# Patient Record
Sex: FEMALE | Race: OTHER | URBAN - METROPOLITAN AREA
[De-identification: names, ages, dates, MRNs, and addresses within clinical notes are randomized per-mention and may not be internally consistent; named-entity substitution may affect disease eponyms.]

---

## 2022-06-21 ENCOUNTER — EMERGENCY (EMERGENCY)
Facility: HOSPITAL | Age: 82
LOS: 0 days | Discharge: HOME | End: 2022-06-21
Attending: STUDENT IN AN ORGANIZED HEALTH CARE EDUCATION/TRAINING PROGRAM
Payer: SELF-PAY

## 2022-06-21 VITALS
HEART RATE: 61 BPM | OXYGEN SATURATION: 97 % | TEMPERATURE: 98 F | RESPIRATION RATE: 19 BRPM | SYSTOLIC BLOOD PRESSURE: 145 MMHG | DIASTOLIC BLOOD PRESSURE: 74 MMHG | WEIGHT: 205.03 LBS

## 2022-06-21 DIAGNOSIS — M25.571 PAIN IN RIGHT ANKLE AND JOINTS OF RIGHT FOOT: ICD-10-CM

## 2022-06-21 DIAGNOSIS — E78.5 HYPERLIPIDEMIA, UNSPECIFIED: ICD-10-CM

## 2022-06-21 DIAGNOSIS — E11.9 TYPE 2 DIABETES MELLITUS WITHOUT COMPLICATIONS: ICD-10-CM

## 2022-06-21 DIAGNOSIS — I10 ESSENTIAL (PRIMARY) HYPERTENSION: ICD-10-CM

## 2022-06-21 DIAGNOSIS — M79.661 PAIN IN RIGHT LOWER LEG: ICD-10-CM

## 2022-06-21 DIAGNOSIS — E78.00 PURE HYPERCHOLESTEROLEMIA, UNSPECIFIED: ICD-10-CM

## 2022-06-21 PROCEDURE — 73610 X-RAY EXAM OF ANKLE: CPT | Mod: 26,RT

## 2022-06-21 PROCEDURE — 99284 EMERGENCY DEPT VISIT MOD MDM: CPT

## 2022-06-21 PROCEDURE — 73590 X-RAY EXAM OF LOWER LEG: CPT | Mod: 26,RT

## 2022-06-21 NOTE — ED ADULT TRIAGE NOTE - CHIEF COMPLAINT QUOTE
patient c/o of R ankle swelling and pain started 1 week  days ago , denied injury , denied calf pain no chest pain no difficulty breathing , come from [place] 1 month ago

## 2022-06-21 NOTE — ED PROVIDER NOTE - PATIENT PORTAL LINK FT
You can access the FollowMyHealth Patient Portal offered by [facility] by registering at the following website: [email or web address]. By joining [employer]’s FollowMyHealth portal, you will also be able to view your health information using other applications (apps) compatible with our system.

## 2022-06-21 NOTE — ED ADULT NURSE NOTE - CHIEF COMPLAINT QUOTE
patient c/o of R ankle swelling and pain started 1 week  days ago , denied injury , denied calf pain no chest pain no difficulty breathing , come from [place] 1 month ago

## 2022-06-21 NOTE — ED ADULT NURSE NOTE - OBJECTIVE STATEMENT
Pt AOx4, daughter in law at bedside translating. As per daughter in law, pt has been experiencing R ankle pain for about a week now with increasing pain and swelling. Pain score 5/10 on rest, 8/10 on movement. Daughter in law states that pt has not been able to bear weight on right leg as much as she usually can. Daughter in law denies any recent falls or trauma to right leg. Active ROM noted on right foot with cap refill less than 2 seconds. Swelling on right ankle greater than left ankle. Pt denies SOB, fever/chills, dizziness, headache, blurred vision, chest pain, N/V/D, or dysuria.

## 2022-06-21 NOTE — ED PROVIDER NOTE - MUSCULOSKELETAL, MLM
Spine appears normal, range of motion is not limited, swelling to right lateral posterior ankle, normal pulses, no discoloration, and (+) calf pain.

## 2022-06-21 NOTE — ED PROVIDER NOTE - OBJECTIVE STATEMENT
82 year old female with PMH of DM, HLD, and knee replacement few years ago who presents to the ED for right ankle swelling and pain that started 1 week ago. Pt denies calf pain, trauma, injury, CP, and difficulty breathing. Pt recently traveled from [place] x1 month ago.

## 2022-11-28 NOTE — ED PROVIDER NOTE - IV ALTEPLASE INCLUSION HIDDEN
Patient Quality Outreach    Patient is due for the following:   Asthma  -  ACT needed    Next Steps:   Patient needs to complete an ACT.    Type of outreach:    Tried calling patient. Voicemail was full.      Questions for provider review:    None     Blanca Mays, CMA        
show

## 2024-06-17 ENCOUNTER — EMERGENCY (EMERGENCY)
Facility: HOSPITAL | Age: 84
LOS: 0 days | Discharge: HOME | End: 2024-06-17
Attending: STUDENT IN AN ORGANIZED HEALTH CARE EDUCATION/TRAINING PROGRAM
Payer: SELF-PAY

## 2024-06-17 VITALS
OXYGEN SATURATION: 98 % | SYSTOLIC BLOOD PRESSURE: 148 MMHG | TEMPERATURE: 98 F | DIASTOLIC BLOOD PRESSURE: 69 MMHG | RESPIRATION RATE: 18 BRPM | HEART RATE: 73 BPM

## 2024-06-17 VITALS
HEART RATE: 72 BPM | SYSTOLIC BLOOD PRESSURE: 173 MMHG | WEIGHT: 187.39 LBS | RESPIRATION RATE: 18 BRPM | TEMPERATURE: 98 F | HEIGHT: 60 IN | OXYGEN SATURATION: 98 % | DIASTOLIC BLOOD PRESSURE: 80 MMHG

## 2024-06-17 DIAGNOSIS — Z96.653 PRESENCE OF ARTIFICIAL KNEE JOINT, BILATERAL: ICD-10-CM

## 2024-06-17 DIAGNOSIS — M25.511 PAIN IN RIGHT SHOULDER: ICD-10-CM

## 2024-06-17 DIAGNOSIS — E78.5 HYPERLIPIDEMIA, UNSPECIFIED: ICD-10-CM

## 2024-06-17 DIAGNOSIS — E11.9 TYPE 2 DIABETES MELLITUS WITHOUT COMPLICATIONS: ICD-10-CM

## 2024-06-17 DIAGNOSIS — G89.29 OTHER CHRONIC PAIN: ICD-10-CM

## 2024-06-17 DIAGNOSIS — I10 ESSENTIAL (PRIMARY) HYPERTENSION: ICD-10-CM

## 2024-06-17 PROBLEM — E78.00 PURE HYPERCHOLESTEROLEMIA, UNSPECIFIED: Chronic | Status: ACTIVE | Noted: 2022-06-21

## 2024-06-17 PROCEDURE — 99053 MED SERV 10PM-8AM 24 HR FAC: CPT

## 2024-06-17 PROCEDURE — 99284 EMERGENCY DEPT VISIT MOD MDM: CPT

## 2024-06-17 PROCEDURE — 73030 X-RAY EXAM OF SHOULDER: CPT | Mod: 26,RT

## 2024-06-17 RX ORDER — METHOCARBAMOL 500 MG/1
1500 TABLET, FILM COATED ORAL ONCE
Refills: 0 | Status: COMPLETED | OUTPATIENT
Start: 2024-06-17 | End: 2024-06-17

## 2024-06-17 RX ORDER — METHOCARBAMOL 500 MG/1
2 TABLET, FILM COATED ORAL
Qty: 40 | Refills: 0
Start: 2024-06-17 | End: 2024-06-26

## 2024-06-17 RX ORDER — LIDOCAINE 4 G/100G
1 CREAM TOPICAL ONCE
Refills: 0 | Status: COMPLETED | OUTPATIENT
Start: 2024-06-17 | End: 2024-06-17

## 2024-06-17 RX ORDER — KETOROLAC TROMETHAMINE 30 MG/ML
15 SYRINGE (ML) INJECTION ONCE
Refills: 0 | Status: DISCONTINUED | OUTPATIENT
Start: 2024-06-17 | End: 2024-06-17

## 2024-06-17 RX ORDER — OXYCODONE AND ACETAMINOPHEN 5; 325 MG/1; MG/1
1 TABLET ORAL
Qty: 5 | Refills: 0
Start: 2024-06-17 | End: 2024-06-21

## 2024-06-17 RX ORDER — LIDOCAINE 4 G/100G
1 CREAM TOPICAL
Qty: 4 | Refills: 0
Start: 2024-06-17 | End: 2024-06-26

## 2024-06-17 RX ADMIN — METHOCARBAMOL 1500 MILLIGRAM(S): 500 TABLET, FILM COATED ORAL at 02:17

## 2024-06-17 RX ADMIN — Medication 15 MILLIGRAM(S): at 02:16

## 2024-06-17 RX ADMIN — LIDOCAINE 1 PATCH: 4 CREAM TOPICAL at 02:21

## 2024-06-17 NOTE — ED ADULT NURSE NOTE - NSFALLHARMRISKINTERV_ED_ALL_ED

## 2024-06-17 NOTE — ED ADULT TRIAGE NOTE - CHIEF COMPLAINT QUOTE
pt c/o right shoulder pain this morning.  Pt unable to raise arm on own.  denies any fall or trauma to area.  pt able to move hand, fingers, and able to bend at elbow.   hx of HTN, cardiac stents, DM.  nkda

## 2024-06-17 NOTE — ED PROVIDER NOTE - CLINICAL SUMMARY MEDICAL DECISION MAKING FREE TEXT BOX
84-year-old female with past medical history of hypertension hyperlipidemia diabetes presenting with right shoulder pain for 3 days.  Patient states she did not have any trauma.  Has had chronic right shoulder pain but worse over the last 3 days, worse tonight.  Pain with movement.  Denies any systemic symptoms such as fever or chills.  Denies chest pain.  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  Not sure if she has arthritis in the shoulder.  Exam findings above, patient initially appears uncomfortable.  However after analgesia more comfortable now, x-ray shows likely arthritis.  Will send analgesia to pharmacy and sports medicine follow-up.  Patient and family comfortable plan for discharge

## 2024-06-17 NOTE — ED PROVIDER NOTE - PATIENT PORTAL LINK FT
You can access the FollowMyHealth Patient Portal offered by [facility] by registering at the following website: [email or web address]. By joining [employer]’s FollowMyHealth portal, you will also be able to view your health information using other applications (apps) compatible with our system.

## 2024-06-17 NOTE — ED ADULT NURSE NOTE - OBJECTIVE STATEMENT
pt Tajik speaking with daughter in law at bedside translating. Covering for primary RN Lukasz. Pt c/o right shoulder pain this morning. Pt states the right shoulder would sometimes bother her but today it was intolerable pain for the first time. Pt visiting from [place]. Pt unable to raise arm on own. Palpable pulses in b/l arms at this time. Pt denies any fall or trauma to area, numbness or tingling, SOB, cp, n/v/d, fever or chills. Pt able to move hand, fingers, and able to bend at elbow. PMHx of HTN, cardiac stents, DM. nkda. Right arm edema and tenderness noted at this time

## 2024-06-17 NOTE — ED PROVIDER NOTE - PHYSICAL EXAMINATION
General: No acute distress, mentation at baseline,  well nourished, well developed  HEENT: NCAT, Neck supple without meningismus, PERRL, no conjunctival injection  Lungs: CTAB, No wheeze or crackles, No retractions, No increased work of breathing  Heart: S1S2 RRR, No M/R/G, Pules equal Bilaterally in upper and lower extremities distally  Abd: soft, NT/ND, No guarding, No rebound.  No hernias, no palpable masses.  Extrem: FROM in all joints, no gross deformities appreciated, no significant edema noted, No ulcers. Cap refil < 2sec. Appearance of Glenohumeral joint normal, Nontender Clavicle and Humerus, Sensation over deltoid intact, Neurovascular exam distally intact, Compartments surrounding are soft. able to range but with signifcant pain  Skin: No rash noted, warm dry.  Neuro:  Grossly normal.  No difficulty ambulating. No focal deficits.  Psychiatric: Appropriate mood and affect.

## 2024-06-17 NOTE — ED PROVIDER NOTE - NSFOLLOWUPINSTRUCTIONS_ED_ALL_ED_FT
Arthritis: Care Instructions  Skip Navigation    Overview  Arthritis, also called osteoarthritis, is a breakdown of the cartilage that cushions your joints. When the cartilage wears down, your bones rub against each other. This causes pain and stiffness. Many people have some arthritis as they age. Arthritis most often affects the joints of the spine, hands, hips, knees, or feet.    Arthritis never goes away completely. But medicine and home treatment can help reduce pain and help you stay active.    Follow-up care is a key part of your treatment and safety. Be sure to make and go to all appointments, and call your doctor if you are having problems. It's also a good idea to know your test results and keep a list of the medicines you take.    How can you care for yourself at home?  Stay at a healthy weight. Being overweight puts extra strain on your joints.  Talk to your doctor or physical therapist about exercises that will help ease joint pain.  Stretch. You may enjoy gentle forms of yoga to help keep your joints and muscles flexible.  Walk instead of jog. Other types of exercise that are less stressful on the joints include riding a bike, swimming, vernon chi, or water exercise.  Lift weights. Strong muscles help reduce stress on your joints. Stronger thigh muscles, for example, take some of the stress off of the knees and hips. Learn the right way to lift weights so you don't make joint pain worse.  Take your medicines exactly as prescribed. Call your doctor if you think you are having a problem with your medicine.  Take pain medicines exactly as directed.  If the doctor gave you a prescription medicine for pain, take it as prescribed.  If you are not taking a prescription pain medicine, ask your doctor if you can take an over-the-counter medicine.  Use a cane, crutch, walker, or another device if you need help to get around. These can help rest your joints. You also can use other things to make life easier, such as a higher toilet seat and padded handles on kitchen utensils.  Do not sit in low chairs. They can make it hard to get up.  Put heat or cold on your sore joints as needed. Use whichever helps you most. You can also switch between hot and cold packs.  Apply heat 2 or 3 times a day for 20 to 30 minutes—using a heating pad, hot shower, or hot pack—to relieve pain and stiffness. But don't use heat on a swollen joint.  Put ice or a cold pack on your sore joint for 10 to 20 minutes at a time. Put a thin cloth between the ice and your skin.  When should you call for help?  	  Call your doctor now or seek immediate medical care if:    You have sudden swelling, warmth, or pain in any joint.  You have joint pain and a fever or rash.  You have such bad pain that you cannot use a joint.  Watch closely for changes in your health, and be sure to contact your doctor if:    You have mild joint symptoms that continue even with more than 6 weeks of care at home.  You have stomach pain or other problems with your medicine

## 2024-08-05 ENCOUNTER — INPATIENT (INPATIENT)
Facility: HOSPITAL | Age: 84
LOS: 0 days | Discharge: HOME | End: 2024-08-06
Attending: INTERNAL MEDICINE | Admitting: INTERNAL MEDICINE
Payer: SELF-PAY

## 2024-08-05 VITALS
HEIGHT: 60 IN | RESPIRATION RATE: 16 BRPM | WEIGHT: 189.6 LBS | TEMPERATURE: 98 F | OXYGEN SATURATION: 98 % | SYSTOLIC BLOOD PRESSURE: 97 MMHG | DIASTOLIC BLOOD PRESSURE: 59 MMHG | HEART RATE: 69 BPM

## 2024-08-05 DIAGNOSIS — D64.9 ANEMIA, UNSPECIFIED: ICD-10-CM

## 2024-08-05 DIAGNOSIS — R55 SYNCOPE AND COLLAPSE: ICD-10-CM

## 2024-08-05 DIAGNOSIS — I10 ESSENTIAL (PRIMARY) HYPERTENSION: ICD-10-CM

## 2024-08-05 DIAGNOSIS — N17.9 ACUTE KIDNEY FAILURE, UNSPECIFIED: ICD-10-CM

## 2024-08-05 DIAGNOSIS — Z95.5 PRESENCE OF CORONARY ANGIOPLASTY IMPLANT AND GRAFT: Chronic | ICD-10-CM

## 2024-08-05 DIAGNOSIS — I25.10 ATHEROSCLEROTIC HEART DISEASE OF NATIVE CORONARY ARTERY WITHOUT ANGINA PECTORIS: ICD-10-CM

## 2024-08-05 DIAGNOSIS — E11.9 TYPE 2 DIABETES MELLITUS WITHOUT COMPLICATIONS: ICD-10-CM

## 2024-08-05 LAB
ALBUMIN SERPL ELPH-MCNC: 2.9 G/DL — LOW (ref 3.3–5)
ALP SERPL-CCNC: 53 U/L — SIGNIFICANT CHANGE UP (ref 40–120)
ALT FLD-CCNC: 17 U/L — SIGNIFICANT CHANGE UP (ref 12–78)
ANION GAP SERPL CALC-SCNC: 9 MMOL/L — SIGNIFICANT CHANGE UP (ref 5–17)
ANISOCYTOSIS BLD QL: SLIGHT — SIGNIFICANT CHANGE UP
APPEARANCE UR: CLEAR — SIGNIFICANT CHANGE UP
AST SERPL-CCNC: 17 U/L — SIGNIFICANT CHANGE UP (ref 15–37)
BASOPHILS # BLD AUTO: 0.02 K/UL — SIGNIFICANT CHANGE UP (ref 0–0.2)
BASOPHILS NFR BLD AUTO: 0.3 % — SIGNIFICANT CHANGE UP (ref 0–2)
BILIRUB SERPL-MCNC: 0.2 MG/DL — SIGNIFICANT CHANGE UP (ref 0.2–1.2)
BILIRUB UR-MCNC: NEGATIVE — SIGNIFICANT CHANGE UP
BLD GP AB SCN SERPL QL: SIGNIFICANT CHANGE UP
BUN SERPL-MCNC: 77 MG/DL — HIGH (ref 7–23)
CALCIUM SERPL-MCNC: 8.7 MG/DL — SIGNIFICANT CHANGE UP (ref 8.5–10.1)
CHLORIDE SERPL-SCNC: 106 MMOL/L — SIGNIFICANT CHANGE UP (ref 96–108)
CO2 SERPL-SCNC: 23 MMOL/L — SIGNIFICANT CHANGE UP (ref 22–31)
COLOR SPEC: YELLOW — SIGNIFICANT CHANGE UP
CREAT SERPL-MCNC: 1.37 MG/DL — HIGH (ref 0.5–1.3)
DIFF PNL FLD: NEGATIVE — SIGNIFICANT CHANGE UP
EGFR: 38 ML/MIN/1.73M2 — LOW
EOSINOPHIL # BLD AUTO: 0.16 K/UL — SIGNIFICANT CHANGE UP (ref 0–0.5)
EOSINOPHIL NFR BLD AUTO: 2.4 % — SIGNIFICANT CHANGE UP (ref 0–6)
FLUAV AG NPH QL: SIGNIFICANT CHANGE UP
FLUBV AG NPH QL: SIGNIFICANT CHANGE UP
GLUCOSE SERPL-MCNC: 138 MG/DL — HIGH (ref 70–99)
GLUCOSE UR QL: NEGATIVE MG/DL — SIGNIFICANT CHANGE UP
HCT VFR BLD CALC: 18.2 % — CRITICAL LOW (ref 34.5–45)
HGB BLD-MCNC: 5.9 G/DL — CRITICAL LOW (ref 11.5–15.5)
HYPOCHROMIA BLD QL: SLIGHT — SIGNIFICANT CHANGE UP
IMM GRANULOCYTES NFR BLD AUTO: 0.3 % — SIGNIFICANT CHANGE UP (ref 0–0.9)
KETONES UR-MCNC: NEGATIVE MG/DL — SIGNIFICANT CHANGE UP
LEUKOCYTE ESTERASE UR-ACNC: NEGATIVE — SIGNIFICANT CHANGE UP
LYMPHOCYTES # BLD AUTO: 1.81 K/UL — SIGNIFICANT CHANGE UP (ref 1–3.3)
LYMPHOCYTES # BLD AUTO: 26.8 % — SIGNIFICANT CHANGE UP (ref 13–44)
MACROCYTES BLD QL: SLIGHT — SIGNIFICANT CHANGE UP
MAGNESIUM SERPL-MCNC: 2.3 MG/DL — SIGNIFICANT CHANGE UP (ref 1.6–2.6)
MANUAL SMEAR VERIFICATION: SIGNIFICANT CHANGE UP
MCHC RBC-ENTMCNC: 22.9 PG — LOW (ref 27–34)
MCHC RBC-ENTMCNC: 32.4 G/DL — SIGNIFICANT CHANGE UP (ref 32–36)
MCV RBC AUTO: 70.5 FL — LOW (ref 80–100)
MICROCYTES BLD QL: SLIGHT — SIGNIFICANT CHANGE UP
MONOCYTES # BLD AUTO: 0.41 K/UL — SIGNIFICANT CHANGE UP (ref 0–0.9)
MONOCYTES NFR BLD AUTO: 6.1 % — SIGNIFICANT CHANGE UP (ref 2–14)
NEUTROPHILS # BLD AUTO: 4.33 K/UL — SIGNIFICANT CHANGE UP (ref 1.8–7.4)
NEUTROPHILS NFR BLD AUTO: 64.1 % — SIGNIFICANT CHANGE UP (ref 43–77)
NITRITE UR-MCNC: NEGATIVE — SIGNIFICANT CHANGE UP
NRBC # BLD: 0 /100 WBCS — SIGNIFICANT CHANGE UP (ref 0–0)
OVALOCYTES BLD QL SMEAR: SLIGHT — SIGNIFICANT CHANGE UP
PH UR: 5 — SIGNIFICANT CHANGE UP (ref 5–8)
PLAT MORPH BLD: NORMAL — SIGNIFICANT CHANGE UP
PLATELET # BLD AUTO: 121 K/UL — LOW (ref 150–400)
PLATELET COUNT - ESTIMATE: ABNORMAL
POIKILOCYTOSIS BLD QL AUTO: SLIGHT — SIGNIFICANT CHANGE UP
POTASSIUM SERPL-MCNC: 4.4 MMOL/L — SIGNIFICANT CHANGE UP (ref 3.5–5.3)
POTASSIUM SERPL-SCNC: 4.4 MMOL/L — SIGNIFICANT CHANGE UP (ref 3.5–5.3)
PROT SERPL-MCNC: 6.4 GM/DL — SIGNIFICANT CHANGE UP (ref 6–8.3)
PROT UR-MCNC: NEGATIVE MG/DL — SIGNIFICANT CHANGE UP
RBC # BLD: 2.58 M/UL — LOW (ref 3.8–5.2)
RBC # FLD: 15.8 % — HIGH (ref 10.3–14.5)
RBC BLD AUTO: SIGNIFICANT CHANGE UP
SARS-COV-2 RNA SPEC QL NAA+PROBE: SIGNIFICANT CHANGE UP
SCHISTOCYTES BLD QL AUTO: SLIGHT — SIGNIFICANT CHANGE UP
SODIUM SERPL-SCNC: 138 MMOL/L — SIGNIFICANT CHANGE UP (ref 135–145)
SP GR SPEC: 1.01 — SIGNIFICANT CHANGE UP (ref 1–1.03)
TROPONIN I, HIGH SENSITIVITY RESULT: 14.5 NG/L — SIGNIFICANT CHANGE UP
UROBILINOGEN FLD QL: 0.2 MG/DL — SIGNIFICANT CHANGE UP (ref 0.2–1)
WBC # BLD: 6.75 K/UL — SIGNIFICANT CHANGE UP (ref 3.8–10.5)
WBC # FLD AUTO: 6.75 K/UL — SIGNIFICANT CHANGE UP (ref 3.8–10.5)

## 2024-08-05 PROCEDURE — 99291 CRITICAL CARE FIRST HOUR: CPT

## 2024-08-05 PROCEDURE — 93010 ELECTROCARDIOGRAM REPORT: CPT

## 2024-08-05 PROCEDURE — 71046 X-RAY EXAM CHEST 2 VIEWS: CPT | Mod: 26

## 2024-08-05 PROCEDURE — 99223 1ST HOSP IP/OBS HIGH 75: CPT

## 2024-08-05 RX ORDER — ONDANSETRON HCL/PF 4 MG/2 ML
4 VIAL (ML) INJECTION EVERY 8 HOURS
Refills: 0 | Status: DISCONTINUED | OUTPATIENT
Start: 2024-08-05 | End: 2024-08-06

## 2024-08-05 RX ORDER — ONDANSETRON HCL/PF 4 MG/2 ML
4 VIAL (ML) INJECTION ONCE
Refills: 0 | Status: COMPLETED | OUTPATIENT
Start: 2024-08-05 | End: 2024-08-05

## 2024-08-05 RX ORDER — DEXTROSE 4 G
25 TABLET,CHEWABLE ORAL ONCE
Refills: 0 | Status: DISCONTINUED | OUTPATIENT
Start: 2024-08-05 | End: 2024-08-06

## 2024-08-05 RX ORDER — CITALOPRAM HYDROBROMIDE 20 MG
20 TABLET ORAL DAILY
Refills: 0 | Status: DISCONTINUED | OUTPATIENT
Start: 2024-08-05 | End: 2024-08-06

## 2024-08-05 RX ORDER — DOXAZOSIN 2 MG/1
4 TABLET ORAL DAILY
Refills: 0 | Status: DISCONTINUED | OUTPATIENT
Start: 2024-08-05 | End: 2024-08-06

## 2024-08-05 RX ORDER — FERROUS SULFATE 325(65) MG
1 TABLET ORAL
Refills: 0 | DISCHARGE

## 2024-08-05 RX ORDER — ESOMEPRAZOLE MAGNESIUM 40 MG
1 CAPSULE,DELAYED RELEASE (ENTERIC COATED) ORAL
Refills: 0 | DISCHARGE

## 2024-08-05 RX ORDER — MAGNESIUM, ALUMINUM HYDROXIDE 200-225/5
30 SUSPENSION, ORAL (FINAL DOSE FORM) ORAL EVERY 4 HOURS
Refills: 0 | Status: DISCONTINUED | OUTPATIENT
Start: 2024-08-05 | End: 2024-08-06

## 2024-08-05 RX ORDER — ACETAMINOPHEN 500 MG
650 TABLET ORAL EVERY 6 HOURS
Refills: 0 | Status: DISCONTINUED | OUTPATIENT
Start: 2024-08-05 | End: 2024-08-06

## 2024-08-05 RX ORDER — METOPROLOL TARTRATE 100 MG
1 TABLET ORAL
Refills: 0 | DISCHARGE

## 2024-08-05 RX ORDER — METOPROLOL TARTRATE 100 MG
50 TABLET ORAL DAILY
Refills: 0 | Status: DISCONTINUED | OUTPATIENT
Start: 2024-08-05 | End: 2024-08-06

## 2024-08-05 RX ORDER — CITALOPRAM HYDROBROMIDE 20 MG
1 TABLET ORAL
Refills: 0 | DISCHARGE

## 2024-08-05 RX ORDER — DEXTROSE 4 G
12.5 TABLET,CHEWABLE ORAL ONCE
Refills: 0 | Status: DISCONTINUED | OUTPATIENT
Start: 2024-08-05 | End: 2024-08-06

## 2024-08-05 RX ORDER — INSULIN LISPRO 100/ML
VIAL (ML) SUBCUTANEOUS
Refills: 0 | Status: DISCONTINUED | OUTPATIENT
Start: 2024-08-05 | End: 2024-08-06

## 2024-08-05 RX ORDER — DEXTROSE MONOHYDRATE, SODIUM CHLORIDE, SODIUM LACTATE, CALCIUM CHLORIDE, MAGNESIUM CHLORIDE 1.5; 538; 448; 18.4; 5.08 G/100ML; MG/100ML; MG/100ML; MG/100ML; MG/100ML
1000 SOLUTION INTRAPERITONEAL
Refills: 0 | Status: DISCONTINUED | OUTPATIENT
Start: 2024-08-05 | End: 2024-08-06

## 2024-08-05 RX ORDER — ASPIRIN 500 MG
1 TABLET ORAL
Refills: 0 | DISCHARGE

## 2024-08-05 RX ORDER — BACTERIOSTATIC SODIUM CHLORIDE 0.9 %
500 VIAL (ML) INJECTION ONCE
Refills: 0 | Status: COMPLETED | OUTPATIENT
Start: 2024-08-05 | End: 2024-08-05

## 2024-08-05 RX ORDER — MELATONIN 3 MG
3 TABLET ORAL AT BEDTIME
Refills: 0 | Status: DISCONTINUED | OUTPATIENT
Start: 2024-08-05 | End: 2024-08-06

## 2024-08-05 RX ORDER — LINAGLIPTIN 5 MG/1
1 TABLET, FILM COATED ORAL
Refills: 0 | DISCHARGE

## 2024-08-05 RX ORDER — GLUCAGON INJECTION, SOLUTION 0.5 MG/.1ML
1 INJECTION, SOLUTION SUBCUTANEOUS ONCE
Refills: 0 | Status: DISCONTINUED | OUTPATIENT
Start: 2024-08-05 | End: 2024-08-06

## 2024-08-05 RX ORDER — DOXAZOSIN 2 MG/1
1 TABLET ORAL
Refills: 0 | DISCHARGE

## 2024-08-05 RX ORDER — PANTOPRAZOLE SODIUM 20 MG/1
40 TABLET, DELAYED RELEASE ORAL
Refills: 0 | Status: DISCONTINUED | OUTPATIENT
Start: 2024-08-05 | End: 2024-08-06

## 2024-08-05 RX ORDER — FERROUS SULFATE 325(65) MG
325 TABLET ORAL DAILY
Refills: 0 | Status: DISCONTINUED | OUTPATIENT
Start: 2024-08-05 | End: 2024-08-06

## 2024-08-05 RX ORDER — DEXTROSE 4 G
15 TABLET,CHEWABLE ORAL ONCE
Refills: 0 | Status: DISCONTINUED | OUTPATIENT
Start: 2024-08-05 | End: 2024-08-06

## 2024-08-05 RX ADMIN — Medication 4 MILLIGRAM(S): at 18:45

## 2024-08-05 RX ADMIN — Medication 500 MILLILITER(S): at 19:00

## 2024-08-05 RX ADMIN — Medication 1000 MILLILITER(S): at 18:30

## 2024-08-05 NOTE — ED ADULT NURSE NOTE - OBJECTIVE STATEMENT
83 yo female bib daughter-in-law c/o generalized weakness, fatigue x3 days. Pt states feel similar to previous episodes of anemia which required blood transfusions. Per pt, has to have blood transfusions every few months. Pmhx iron deficiency anemia, DM, HTN, HLD, carpal tunnel left wrist. Pt denies any epistaxis, hematuria, bloody stool. Pt denies cp, sob, dizziness, palpitations, headache, blurry vision, light sensitivity, n/v/d/c, abdominal pain, neck pain, back pain, numbness/tingling. 12 lead ECG completed. Respirations even and unlabored.

## 2024-08-05 NOTE — ED PROVIDER NOTE - PROGRESS NOTE DETAILS
KRYSTYNA MULLEN: late entry-- approx 1 hr ago pt had near syncopal episode after walking to bathroom. /60, but lightheaded/nauseated/dry heaving/diaphoretic-- joint decision making with daughter in law to keep pt in hospital.

## 2024-08-05 NOTE — ED CLERICAL - NS ED CLERK UNITS
Called and spoke with Jack and confirmed he is talking about the no right handed work restrictions.  I let him know I would fax the restrictions as requested.    Letter faxed   RF TELE

## 2024-08-05 NOTE — H&P ADULT - HISTORY OF PRESENT ILLNESS
Ru Cooper is an 84 year old female with PMHx of HTN, HLD, CAD (s/p 3 stents), and anemia (receives pRBC transfusion q3 months) who presented to the ED on 8/5/24 for complaints of weakness.    Chinese Estephania ID #[id number] utilized. Patient reports she has been having generalized weakness for the past few days and difficulty walking due to that. Has been feeling fatigued. States this is always how she feels when she needs a blood transfusion. Last transfusion was about three months ago. Denies hematochezia or melena. States she was informed her anemia is from bleeding from small intestine? However, not offered intervention due to age. Last took aspirin yesterday, which is prescribed for her history of PCI. Baseline functional status is ambulates with cane and independent with all ADLs. Lives at home alone in [place]. Currently visiting daughter in [place].     In the ED, VSS except BP as low as 97/59. No documented hypoxia but placed on 3L for symptomatic relief?Hgb 5.9, plts 121K, BUN 77, Cr 1.37. U/A unremarkable. COVID/influenza negative. CXR unremarkable. Typed and screened. Received  cc bolus. Patient got out of bed to use the bathroom and was found slumped over and unresponsive in bedside chair. Patient then became more alert and began vomiting. Ondansetron 4 mg IV administered. Ru Cooper is an 84 year old female with PMHx of HTN, HLD, CAD (s/p 3 stents), and anemia (receives pRBC transfusion q3 months) who presented to the ED on 8/5/24 for complaints of weakness.    Aidan Estephania ID #[id number] utilized. Patient reports she has been having generalized weakness for the past few days and difficulty walking due to that. Has been feeling fatigued. States this is always how she feels when she needs a blood transfusion. Last transfusion was about three months ago. Denies hematochezia or melena. Of note, ER PA documentation with dark stools for the past week but patient denied this at the time of my examination. States she was informed her anemia is from bleeding from small intestine? However, not offered intervention due to age. Last took aspirin yesterday, which is prescribed for her history of PCI. Baseline functional status is ambulates with cane and independent with all ADLs. Lives at home alone in [place]. Currently visiting daughter in [place].     In the ED, VSS except BP as low as 97/59. No documented hypoxia but placed on 3L for symptomatic relief?Hgb 5.9, plts 121K, BUN 77, Cr 1.37. U/A unremarkable. COVID/influenza negative. CXR unremarkable. Typed and screened. Received  cc bolus. Two unit pRBCs ordered but not yet administered. Patient got out of bed to use the bathroom and was found slumped over and unresponsive in bedside chair. Patient then became more alert and began vomiting. Ondansetron 4 mg IV administered.

## 2024-08-05 NOTE — ED PROVIDER NOTE - CLINICAL SUMMARY MEDICAL DECISION MAKING FREE TEXT BOX
ddX: anemia, electrolyte abnormality, arrhythmia, blessing, uti, viral illness   symptomatic anemia   discussed with dr. barr accepts admission to her service

## 2024-08-05 NOTE — ED ADULT NURSE REASSESSMENT NOTE - NS ED NURSE REASSESS COMMENT FT1
Pt found slumped over and unresponsive in bedside chair with DIL beside her calling for help. Pt unresponsive to pain, +snoring. Palpable radial pulse present. Pt was lifted back into bed by 4 staff members. KRYSTYNA Jones at bedside. Pt became more alert once back in bed and began vomiting (zofran 4mg iv push given x1). 2L O2 NC placed with improvement of spo2 to 100%. Pt awaiting PRBC transfusion. Confirmatory tube sent to lab, awaiting results. Side rails up. Comfort and safety maintained. Cardiac and spo2 monitoring in place.

## 2024-08-05 NOTE — ED PROVIDER NOTE - CRITERIA ADMIT PEDS PT
May 19, 2021       Katharine Monae MD  [address]  [address]  [facility] [address]  Via In Basket      Patient: Rula Shipman   YOB: 1986   Date of Visit: 2021       Dear Dr. Monae:    Thank you for referring Rula Shipman to me for evaluation. Below are my notes for this visit with her.    If you have questions, please do not hesitate to call me. I look forward to following your patient along with you.      Sincerely,        Chucky Jimenez, DO        CC: No Recipients  Chucky Jimenez DO  2021  3:14 PM  Signed           NEUROSURGERY Consult    :  1986, MRN:  [id number], CSN:  [id number]  Rula Shipman is seen on 2021 in Neurosurgical Consultation at [facility] at the request of Katharine Monae MD.  PCP:  Katharine Monae MD     HISTORY of PRESENT ILLNESS:    Ms. Shipman is a 35 year old   female presenting with complaint of low back and leg pain. Patient with mild low back pain issues since she was a teenager. Mostly improved with rest. She did have sciatica during both pregnancies which resolved spontaneously near the end of the pregnancy. In January she was placing her child in a car seat, believes she may have twisted the wrong way, then developed more severe back pain. Pain in bilateral buttocks. Worse pain radiating in to the outer left leg and numbness along the outer right leg. She has been going to Chiropractor and PT which has improved her symptoms little. Exacerbated when bending forward, and getting in and out of the car. Little relief with 600mg of Ibuprofen. Xray of her lumbar spine reveals L5-S1 spondylolisthesis.     ALLERGIES:    ALLERGIES:   Allergen Reactions   • Montelukast Hallucinations   • Seasonal Other (See Comments)       MEDICATIONS:    Current Outpatient Medications   Medication Sig Dispense Refill   • ALPRAZolam (XANAX) 0.5 MG tablet Take 0.5 mg by mouth 1 time as needed.     • Magnesium 200 MG Tab Take 1  tablet by mouth 2 (two) times a day.     • Cholecalciferol (Vitamin D3) 1.25 mg (50,000 units) tablet Take 1 tablet by mouth daily.     • albuterol 108 (90 Base) MCG/ACT inhaler Inhale 2 puffs into the lungs every 4 hours as needed for Shortness of Breath or Wheezing. 1 Inhaler 3   • amphetamine-dextroamphetamine (ADDERALL) 30 MG tablet Take 30 mg by mouth daily.     • buPROPion (WELLBUTRIN XL) 150 MG 24 hr tablet Take 150 mg by mouth daily.       No current facility-administered medications for this visit.        MEDICAL History:    Past Medical History:   Diagnosis Date   • Allergic rhinitis    • Anxiety Disorder     PT DENIES ANXIETY   • Asthma     exercised enduced   • Bilateral conjunctivitis    • Closed Colles' fracture 2011    Left   • Fatigue    • Hypoglycemia    • Insomnia    • Pap Dysplasia - LSIL 2007    Mild dysplasia/SKYLAR I   • Pharyngitis    • PILONIDAL CYST     removed     Patient Active Problem List   Diagnosis   • Manic (Bipolar) Depression   • Anxiety Disorder   • Environmental allergies   • Insulin controlled gestational diabetes mellitus (GDM) in third trimester   • Migraine without aura and without status migrainosus, not intractable   • Chronic or old strain of lumbosacral spine   • 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   • Spondylolisthesis at L5-S1 level       SURGICAL History:    Past Surgical History:   Procedure Laterality Date   •  section, low transverse     • Colposcopy cervix & upper / adjacent vagina  2007    Colposcopy   • Nerve repair      of hand   • Pap smear,routine  2010    Negative   • Past surgical history         • Wrist fracture surgery         SOCIAL History:    Social History     Tobacco Use   • Smoking status: Never Smoker   • Smokeless tobacco: Never Used   Substance Use Topics   • Alcohol use: No   • Drug use: No       FAMILY History:    Family History   Problem Relation Age of Onset   • Cancer Maternal Aunt            breast, ? lung as well. 60s   • Cancer Maternal Grandmother         breast dx: 83   • Cancer Paternal Grandfather         lung-heavy smoker   • Heart Paternal Grandfather         heart attack   • Cancer Paternal Aunt         lung-heavy smoker   • Thyroid Mother    • Thyroid Father    • Depression Father        HEALTH MAINTENANCE:    Health Maintenance   Topic Date Due   • Pneumococcal Vaccine 0-64 (1 of 2 - PPSV23) Never done   • Influenza Vaccine (Season Ended) 08/01/2021   • Cervical Cancer Screening HPV CO-Testing  11/09/2021   • DTaP/Tdap/Td Vaccine (3 - Td) 05/19/2027   • COVID-19 Vaccine  Completed   • Hepatitis B Vaccine  Aged Out   • Meningococcal Vaccine  Aged Out   • HPV Vaccine  Aged Out       REVIEW of SYSTEMS:    Constitutional Symptoms  [] Good general health lately  [] Recent weight gain  [] Recent weight loss  [] Fatigue  [] Fever    Eyes  [] Blurred Vision  [] Double vision  [] Wear glasses/contact lenses    Ears/Nose/Mouth/Throat  [] Hearing loss  [] Ringing in ears  [] Chronic sinus problem   [] Nose bleeds  [] Sore throat  [] Difficulty swallowing    Cardiovascular  [] Swelling feet, ankles or hands  [] Chest pain or angina pectoris  [] Palpitations   [] Cold extremities    Respiratory  [] Wheezing  [] Chronic or frequent coughs   [] Spitting up blood   [] Shortness of breath  [] Sleep apnea   Gastrointestinal  [] Abdominal pain  [] Loss of appetite  [] Nausea  [] Diarrhea  [] Constipation    Integumentary  [] Breast discharge  [] Change in skin color  [] Change in hair or nails  [] Varicose veins  [] Rash or itching    Neurologic  [] Frequent headaches  [] History of head injury  [] Seizures  [] Numbness or tingling  [] Tremors  [] Paralysis  [] Lightheaded  [] Dizziness    Psychiatric  [] Insomnia  [] Memory loss  [] Anxiety  [] Depression   Endocrine  [] Change in hat or glove size  [] Excessive thirst or urination  [] Temperature intolerance  [] Hormone issues    Musculoskeletal  []  Joint pain  [] Joint stiffness  [] Swollen joints  [] Difficulty walking  [] Muscle pain or cramps  [x] Back pain  [] Weakness    Hematologic/Lymphatic  [] Slow to heal after cuts  [] Bleeding/bruising tendency  [] Enlarged lymph glands    Genitourinary  [] Frequent urination  [] Burning or painful urination  [] Blood in urine  [] Straining to urinate  [] Incontinence or dribbling  [] History of kidney stones  [] Sexual difficulty   All other systems reviewed and are negative other than those checked above.    PHYSICAL EXAM:    Visit Vitals  /80 (BP Location: LUE - Left upper extremity, Patient Position: Sitting, Cuff Size: Regular)   Pulse 80   Ht 5' 3\" (1.6 m)   Wt 88.5 kg   LMP 03/08/2021 (Approximate)   BMI 34.54 kg/m²     Ms. Shipman is a well developed, well nourished 35 year old female in no acute distress.   Higher cognitive functions:   Awake, alert, and oriented to time, place, person, and circumstance   Memory -- good   Speech and Language functions -- intact   Attention and Concentration -- good   Knowledge -- good  Cranial Nerves II-XII    CN 2 Vision intact    CN 3,4,6 Extraocular motion intact  Pupils equally round and reactive    CN 5 Facial sensation intact    CN 7 Facial motor symmetric    CN 8 Hearing intact    CN 9 Palate symmetric    CN 11 Shoulder shrug 5/5    CN 12 Tongue movement symmetric   Motor Exam demonstrates no fasciculations, wasting, or changes in tone   in all extremities.  Strength is 5/5 and equal bilaterally in all extremities.  Sensation is grossly intact in all extremities.  Coordination is good in all extremities.  Deep Tendon Reflexes are 1/4 in all extremities.  Negative straight leg raise.  Station is steady.  Gait is tandem.  Pulses are intact in all extremities, which are appropriate temperature.    No clubbing, cyanosis or edema.      IMAGING:  Xray lumbar spine images reviewed:  Grade I L5-S1 spondylolisthesis, decrease in disc space height at L5-S1. No  fractures. Sagittal alignment appropriate.    _______________________________________________________________      IMPRESSIONs:    1. Spondylosis of lumbosacral spine with radiculopathy    2. Spondylolisthesis at L5-S1 level       35 year old female with back and leg pain, no relief with PT and Chiropractor to this point. Xray reveals L5-S1 spondylolisthesis with loss of disc space height. Will obtain additional imaging to better characterize and evaluate for nerve compression.    RECOMMENDATIONs:    - MRI lumbar spine  - Lumbar flexion/extension xrays  - return when imaging complete    Future Appointments   Date Time Provider Department Center   5/20/2021  7:00 AM [facility]   5/28/2021  1:15 PM [facility] Lion[facility]   6/3/2021  7:45 AM Li Saleem, PT [facility]   6/7/2021  1:30 PM Li A Schedgar, PT [facility]   6/10/2021  4:00 PM Li A Schilz, PT [facility]   6/14/2021  1:30 PM Li A Schilz, PT [facility]   6/17/2021  9:45 AM Li A Schilz, PT [facility]   6/28/2021  1:30 PM Li A Schilz, PT [facility]   6/30/2021 11:00 AM BEN Padilla   7/1/2021  9:00 AM [facility]        Chucky Jimenez DO   5/19/2021    cc: MD Katharine Rowan MD     No orders of the defined types were placed in this encounter.                   No

## 2024-08-05 NOTE — ED ADULT NURSE NOTE - NSFALLHARMRISKINTERV_ED_ALL_ED

## 2024-08-05 NOTE — ED PROVIDER NOTE - OBJECTIVE STATEMENT
84-year-old female with PMH HTN, HLD, diabetes, CAD s/p PCI with 3 stents, known GI bleed from small intestine which they are not doing an intervention for 2/2 age and complex spot of bleed--with blood transfusions every 3 to 4 months, presents with global weakness x 3 days-- difficulty walking with cane/walker @ home x today.   +dark stools x 1 wk but also takes iron supplements.   +increased frequency of urination x mos  no bleeding from other areas including easy bruising, gingival bleeding, blood in urine, vaginal bleeding.   no falls, loc, cp, sob, back pain, abd pain, vomiting, diarrhea, pain anywhere.   Meds (from [place]):   metoprolol 50  omeprazole 40  aspirin 100  glicazide 60  valsartan/HCTZ 160/12.5  trajenta 5  sitalopram 20  ferrous sulfate   nexium   doxazosin 4

## 2024-08-05 NOTE — ED PROVIDER NOTE - PHYSICAL EXAMINATION
PHYSICAL EXAM:    GENERAL: Alert, appears stated age, well appearing, non-toxic  SKIN: Warm, and dry.   HEAD: NC, AT  EYE: Normal lids/conjunctiva, PERRL, EOMI  ENT: Normal hearing, patent oropharynx   NECK: +supple. No meningismus, or JVD  Pulm: Bilateral BS, normal resp effort, no wheezes, stridor, or retractions  CV: RRR, no M/R/G, 2+and = radial pulses  Abd: soft, non-tender, non-distended. no CVA tenderness.   Mskel: no erythema, cyanosis, edema. no calf tenderness  Neuro: AAOx3. No speech slurring, pronator drift, facial asymmetry. normal finger-to-nose b/l. 5/5 strength throughout

## 2024-08-05 NOTE — ED ADULT TRIAGE NOTE - CHIEF COMPLAINT QUOTE
As per daughter in law, weakness x three days, difficulty walking short distance today . Patient states that she feels like she needs another blood transfusion. Denies sob, chest pain. Hx anemia and blood transfusions every few months. F/S 209

## 2024-08-05 NOTE — ED ADULT NURSE REASSESSMENT NOTE - NS ED NURSE REASSESS COMMENT FT1
Handoff report received from DAMIEN Parnell for shift change. Plan of care reviewed. Pt received resting on stretcher. pt aox3, amb at baseline but extremely weak at this time. Faroese speaking only, daughter at bedside translating. IV site & patency inspected and integrity maintained. bilat hands x2 20 g patent. pt states feeling much better, denies cp, sob, dizziness. Universal fall precautions in place, side rails x2 raised,  socks provided, personal belongings within reach, oriented to call bell system. Continuous cardiac and SpO2 monitoring in progress. Pendign 1 unit prbc and admission. Will continue to monitor.

## 2024-08-05 NOTE — ED PROVIDER NOTE - CRITICAL CARE ATTENDING CONTRIBUTION TO CARE
Seen with KRYSTYNA Jones    85yo F p/w generalized weakness.  Anemic here with known history.  Has gotten transfusions in [place].  Had syncope likely 2/2 anemia here.  Will need transfusion and admission for close monitoring.  Denies GIB at this time.    Upon my evaluation, this patient had a high probability of imminent or life-threatening deterioration due to symptomatic anemia requiring transfusion, which required my direct attention, intervention, and personal management.  The patient has a  medical condition that impairs one or more vital organ systems.  Frequent personal assessment and adjustment of medical interventions was performed.      I have personally provided 31 minutes of critical care time exclusive of time spent on separately billable procedures. Time includes review of laboratory data, radiology results, discussion with consultants, patient and family; monitoring for potential decompensation, as well as time spent retrieving data and reviewing the chart and documenting the visit. Interventions were performed as documented above.

## 2024-08-05 NOTE — H&P ADULT - TIME BILLING
coordination of care with ER PA and ER RN, obtaining history, performing a physical examination, reviewing and interpreting labs and imaging, ordering further studies and tests, explaining the diagnosis and treatment plan to patient and daughter at bedside, completing medication reconciliation, and documentation as above.

## 2024-08-05 NOTE — H&P ADULT - NSHPPHYSICALEXAM_GEN_ALL_CORE
T(C): 36.6 (08-05-24 @ 22:15), Max: 36.7 (08-05-24 @ 12:53)  HR: 71 (08-05-24 @ 22:15) (60 - 74)  BP: 130/55 (08-05-24 @ 22:15) (97/59 - 154/76)  RR: 15 (08-05-24 @ 22:15) (12 - 18)  SpO2: 100% (08-05-24 @ 22:15) (93% - 100%)    CONSTITUTIONAL: Well groomed, no apparent distress  EYES: PERRLA and symmetric, EOMI, b/l conjunctivae pale  ENMT: Oral mucosa with moist membranes   RESP: No respiratory distress, no use of accessory muscles; CTA b/l  CV: RRR  GI: Soft, NT, ND  SKIN: pale

## 2024-08-05 NOTE — H&P ADULT - ASSESSMENT
Ru Cooper is an 84 year old female with PMHx of HTN, HLD, CAD (s/p 3 stents), and anemia (receives pRBC transfusion q3 months) who presented to the ED on 8/5/24 for complaints of weakness and admitted for symptomatic anemia.    Symptomatic anemia      Setswana , Estephania ID #[id number] utilized. Patient reports she has been having generalized weakness for the past few days and difficulty walking due to that. Has been feeling fatigued. States this is always how she feels when she needs a blood transfusion. Last transfusion was about three months ago. Denies hematochezia or melena. States she was informed her anemia is from bleeding from small intestine? However, not offered intervention due to age. Last took aspirin yesterday, which is prescribed for her history of PCI. Baseline functional status is ambulates with cane and independent with all ADLs. Lives at home alone in [place]. Currently visiting daughter in [place].     In the ED, VSS except BP as low as 97/59. No documented hypoxia but placed on 3L for symptomatic relief?Hgb 5.9, plts 121K, BUN 77, Cr 1.37. U/A unremarkable. COVID/influenza negative. CXR unremarkable. Typed and screened. Received  cc bolus and ondansetron 4 mg IV.   Ru Cooper is an 84 year old female with PMHx of HTN, HLD, CAD (s/p 3 stents), and anemia (receives pRBC transfusion q3 months) who presented to the ED on 8/5/24 for complaints of weakness and admitted for symptomatic anemia.    Symptomatic anemia  Complaints of generalized weakness, fatigue, and difficulty ambulating x few days  States this is always how she feels when she needs a blood transfusion, last transfusion was about three months ago  Denies hematochezia or melena,   Reports she was informed her anemia is from bleeding from small intestine?; however, not offered intervention due to age  Hgb 5.9 on admission, baseline ~9? (patient reports this is what she recalls prior hgb to be after transfusion)  No signs of active bleeding  S/p  cc bolus and type and screen in the ED  Two units pRBCs ordered and to be transfused  F/u FOBT, post-transfusion CBC, transfuse for hgb < 7  Consider GI consult    Syncope, suspect secondary to hypovolemia  Episode of being found slumped over and unresponsive to name call while getting out of bed to use the bathroom  Started vomiting when regained consciousness  CXR unremarkable  Unable to obtain orthostatics due to weakness and due to likely inaccuracy due to already receiving IVFs  Unable to order echocardiogram due to [facility]'s policy on inability to order echo if no obvious murmur on exam  Telemetry to monitor for arrhythmias    PRACHI vs. elevated creatinine  BUN 77 and Cr 1.37 on admission unknown baseline Cr  S/p  cc bolus in the ED  Avoid nephrotoxins, renally dose meds  Encourage PO hydration      Chronic medical conditions:  HTN: PTA valsartan / HCTZ 160 / 12.5 mg, metoprolol tartrate 50 mg  NIDDM2: POC qac and qhs, PTA gliclazide 60 mg and linagliptin 5 mg held, low dose SSI qac started, blood glucose goal < 180, f/u A1c  CAD (s/p PCI x 3): PTA  mg held given symptomatic anemia    Medication reconciliation completed using med list provided by patient. All medications are from [place] so medications manually entered into medrec as not available in the [place]    Plan of care discussed with daughter at bedside. Ru Cooper is an 84 year old female with PMHx of HTN, HLD, CAD (s/p 3 stents), and anemia (receives pRBC transfusion q3 months) who presented to the ED on 8/5/24 for complaints of weakness and admitted for symptomatic anemia.    Symptomatic anemia  Complaints of generalized weakness, fatigue, and difficulty ambulating x few days  States this is always how she feels when she needs a blood transfusion, last transfusion was about three months ago  Denies hematochezia or melena,   Reports she was informed her anemia is from bleeding from small intestine?; however, not offered intervention due to age  Hgb 5.9 on admission, baseline ~9? (patient reports this is what she recalls prior hgb to be after transfusion)  No signs of active bleeding  S/p  cc bolus and type and screen in the ED  Two units pRBCs ordered and to be transfused  F/u FOBT, post-transfusion CBC, transfuse for hgb < 7  Consider GI consult    Syncope, suspect secondary to hypovolemia  Episode of being found slumped over and unresponsive to name call while getting out of bed to use the bathroom  Started vomiting when regained consciousness  CXR unremarkable  Unable to obtain orthostatics due to weakness and due to likely inaccuracy due to already receiving IVFs  Unable to order echocardiogram due to [facility]'s policy on inability to order echo if no obvious murmur on exam  Telemetry to monitor for arrhythmias    PRACHI vs. elevated creatinine  BUN 77 and Cr 1.37 on admission unknown baseline Cr  S/p  cc bolus in the ED  Avoid nephrotoxins, renally dose meds  Encourage PO hydration      Chronic medical conditions:  HTN: PTA valsartan / HCTZ 160 / 12.5 mg held due to PRACHI vs. elevated Cr, metoprolol tartrate 50 mg  NIDDM2: POC qac and qhs, PTA gliclazide 60 mg and linagliptin 5 mg held, low dose SSI qac started, blood glucose goal < 180, f/u A1c  CAD (s/p PCI x 3): PTA  mg held given symptomatic anemia    Medication reconciliation completed using med list provided by patient. All medications are from [place] so medications manually entered into medrec as not available in the [place]    Plan of care discussed with daughter at bedside.

## 2024-08-05 NOTE — H&P ADULT - NSHPLABSRESULTS_GEN_ALL_CORE
5.9    6.75  )-----------( 121      ( 05 Aug 2024 15:26 )             18.2     138  |  106  |  77<H>  ----------------------------<  138<H>     08  4.4   |  23  |  1.37<H>    Ca    8.7      05 Aug 2024 15:26  Mg     2.3     -    TPro  6.4  /  Alb  2.9<L>  /  TBili  0.2  /  DBili  x   /  AST  17  /  ALT  17  /  AlkPhos  53  08-    Urinalysis Basic - ( 05 Aug 2024 16:08 )  Color: Yellow / Appearance: Clear / S.014 / pH: 5.0  Gluc: Negative mg/dL / Ketone: Negative mg/dL  / Bili: Negative / Urobili: 0.2 mg/dL   Blood: Negative / Protein: Negative mg/dL / Nitrite: Negative   Leuk Esterase: Negative / RBC: x / WBC x   Sq Epi: x / Bacteria: x  Hyaline Casts: x/WBC Casts: x    Chest x-ray 24  IMPRESSION:  No radiographic evidence of active chest disease.

## 2024-08-05 NOTE — H&P ADULT - NSICDXPASTMEDICALHX_GEN_ALL_CORE_FT
PAST MEDICAL HISTORY:  CAD S/P percutaneous coronary angioplasty     Chronic anemia     Diabetes mellitus type II, non insulin dependent     HTN (hypertension)

## 2024-08-06 VITALS
TEMPERATURE: 98 F | RESPIRATION RATE: 18 BRPM | DIASTOLIC BLOOD PRESSURE: 66 MMHG | SYSTOLIC BLOOD PRESSURE: 135 MMHG | OXYGEN SATURATION: 96 % | HEART RATE: 60 BPM

## 2024-08-06 LAB
A1C WITH ESTIMATED AVERAGE GLUCOSE RESULT: 5.9 % — HIGH (ref 4–5.6)
ANION GAP SERPL CALC-SCNC: 7 MMOL/L — SIGNIFICANT CHANGE UP (ref 5–17)
BUN SERPL-MCNC: 65 MG/DL — HIGH (ref 7–23)
CALCIUM SERPL-MCNC: 9.1 MG/DL — SIGNIFICANT CHANGE UP (ref 8.5–10.1)
CHLORIDE SERPL-SCNC: 107 MMOL/L — SIGNIFICANT CHANGE UP (ref 96–108)
CO2 SERPL-SCNC: 25 MMOL/L — SIGNIFICANT CHANGE UP (ref 22–31)
CREAT SERPL-MCNC: 1.21 MG/DL — SIGNIFICANT CHANGE UP (ref 0.5–1.3)
EGFR: 44 ML/MIN/1.73M2 — LOW
ESTIMATED AVERAGE GLUCOSE: 123 MG/DL — HIGH (ref 68–114)
GLUCOSE BLDC GLUCOMTR-MCNC: 134 MG/DL — HIGH (ref 70–99)
GLUCOSE BLDC GLUCOMTR-MCNC: 141 MG/DL — HIGH (ref 70–99)
GLUCOSE BLDC GLUCOMTR-MCNC: 191 MG/DL — HIGH (ref 70–99)
GLUCOSE SERPL-MCNC: 114 MG/DL — HIGH (ref 70–99)
HCT VFR BLD CALC: 24.6 % — LOW (ref 34.5–45)
HGB BLD-MCNC: 8 G/DL — LOW (ref 11.5–15.5)
MCHC RBC-ENTMCNC: 24.7 PG — LOW (ref 27–34)
MCHC RBC-ENTMCNC: 32.5 G/DL — SIGNIFICANT CHANGE UP (ref 32–36)
MCV RBC AUTO: 75.9 FL — LOW (ref 80–100)
NRBC # BLD: 0 /100 WBCS — SIGNIFICANT CHANGE UP (ref 0–0)
PLATELET # BLD AUTO: 133 K/UL — LOW (ref 150–400)
POTASSIUM SERPL-MCNC: 4 MMOL/L — SIGNIFICANT CHANGE UP (ref 3.5–5.3)
POTASSIUM SERPL-SCNC: 4 MMOL/L — SIGNIFICANT CHANGE UP (ref 3.5–5.3)
RBC # BLD: 3.24 M/UL — LOW (ref 3.8–5.2)
RBC # FLD: 16.9 % — HIGH (ref 10.3–14.5)
SODIUM SERPL-SCNC: 139 MMOL/L — SIGNIFICANT CHANGE UP (ref 135–145)
WBC # BLD: 8.19 K/UL — SIGNIFICANT CHANGE UP (ref 3.8–10.5)
WBC # FLD AUTO: 8.19 K/UL — SIGNIFICANT CHANGE UP (ref 3.8–10.5)

## 2024-08-06 PROCEDURE — 99239 HOSP IP/OBS DSCHRG MGMT >30: CPT

## 2024-08-06 PROCEDURE — 99233 SBSQ HOSP IP/OBS HIGH 50: CPT

## 2024-08-06 RX ADMIN — Medication 20 MILLIGRAM(S): at 13:22

## 2024-08-06 RX ADMIN — Medication 50 MILLIGRAM(S): at 05:51

## 2024-08-06 RX ADMIN — Medication 325 MILLIGRAM(S): at 13:03

## 2024-08-06 RX ADMIN — DOXAZOSIN 4 MILLIGRAM(S): 2 TABLET ORAL at 05:50

## 2024-08-06 RX ADMIN — PANTOPRAZOLE SODIUM 40 MILLIGRAM(S): 20 TABLET, DELAYED RELEASE ORAL at 05:51

## 2024-08-06 NOTE — DISCHARGE NOTE PROVIDER - HOSPITAL COURSE
Ru Cooper is an 84 year old female with PMHx of HTN, HLD, CAD (s/p 3 stents), and anemia (receives pRBC transfusion q3 months) who presented to the ED on 8/5/24 for complaints of weakness.    Cymro , Estephania ID #[id number] utilized. Patient reports she has been having generalized weakness for the past few days and difficulty walking due to that. Has been feeling fatigued. States this is always how she feels when she needs a blood transfusion. Last transfusion was about three months ago. Denies hematochezia or melena. Of note, ER PA documentation with dark stools for the past week but patient denied this at the time of my examination. States she was informed her anemia is from bleeding from small intestine? However, not offered intervention due to age. Last took aspirin yesterday, which is prescribed for her history of PCI. Baseline functional status is ambulates with cane and independent with all ADLs. Lives at home alone in [place]. Currently visiting daughter in [place].     In the ED, VSS except BP as low as 97/59. No documented hypoxia but placed on 3L for symptomatic relief?Hgb 5.9, plts 121K, BUN 77, Cr 1.37. U/A unremarkable. COVID/influenza negative. CXR unremarkable. Typed and screened. Received  cc bolus. Two unit pRBCs ordered but not yet administered. Patient got out of bed to use the bathroom and was found slumped over and unresponsive in bedside chair. Patient then became more alert and began vomiting. Ondansetron 4 mg IV administered.    On 8/6/24 Patient  now s/p 2units PRBC , Hgb 5.9-> 8.0, known chronic bleed work up done previously in [place]. State patient is high risk for procedure. Patient and family do not want surgical intervention/ procedure , and will follow up with routine transfusions in [place].     . Acute blood loss anemia:  - s/p PRBCs transfusion  - GI consulted- patient and family do not want surgical intervention/ procedure , and will follow up with routine transfusions in [place].  - PPI    2. HTN:  - Well controlled    3. CAD, hyperlipidemia:  - S/p 3 stents  - ASA - Initially held, however patient will continue as per cardiologist in [place]     4. Acute kidney injury:  - Improved  - Likely prerenal    5. Syncope:  - Likely due to anemia.    Patient medically optimized for discharge home. Case discussed with Dr Toure on 8/6/2024, agrees with plan.     Total time spent 36 minutes.

## 2024-08-06 NOTE — PROGRESS NOTE ADULT - SUBJECTIVE AND OBJECTIVE BOX
Patient is a 84y old  Female who presents with a chief complaint of Symptomatic anemia (05 Aug 2024 23:18)      INTERVAL HPI/OVERNIGHT EVENTS: No acute events overnight.     MEDICATIONS  (STANDING):  citalopram 20 milliGRAM(s) Oral daily  dextrose 5%. 1000 milliLiter(s) (50 mL/Hr) IV Continuous <Continuous>  dextrose 5%. 1000 milliLiter(s) (100 mL/Hr) IV Continuous <Continuous>  dextrose 50% Injectable 12.5 Gram(s) IV Push once  dextrose 50% Injectable 25 Gram(s) IV Push once  dextrose 50% Injectable 25 Gram(s) IV Push once  doxazosin 4 milliGRAM(s) Oral daily  ferrous    sulfate 325 milliGRAM(s) Oral daily  glucagon  Injectable 1 milliGRAM(s) IntraMuscular once  insulin lispro (ADMELOG) corrective regimen sliding scale   SubCutaneous three times a day before meals  metoprolol tartrate 50 milliGRAM(s) Oral daily  pantoprazole    Tablet 40 milliGRAM(s) Oral before breakfast    MEDICATIONS  (PRN):  acetaminophen     Tablet .. 650 milliGRAM(s) Oral every 6 hours PRN Temp greater or equal to 38C (100.4F), Mild Pain (1 - 3)  aluminum hydroxide/magnesium hydroxide/simethicone Suspension 30 milliLiter(s) Oral every 4 hours PRN Dyspepsia  dextrose Oral Gel 15 Gram(s) Oral once PRN Blood Glucose LESS THAN 70 milliGRAM(s)/deciliter  melatonin 3 milliGRAM(s) Oral at bedtime PRN Insomnia  ondansetron Injectable 4 milliGRAM(s) IV Push every 8 hours PRN Nausea and/or Vomiting      Allergies    Allergy Status Unknown    Intolerances        REVIEW OF SYSTEMS:  CONSTITUTIONAL: +ve for fatigue  EYES: No eye pain, visual disturbances, or discharge  ENMT:  No difficulty hearing, tinnitus, vertigo; No sinus or throat pain  NECK: No pain or stiffness      Vital Signs Last 24 Hrs  T(C): 36.7 (06 Aug 2024 11:31), Max: 36.7 (05 Aug 2024 17:35)  T(F): 98 (06 Aug 2024 11:31), Max: 98.1 (06 Aug 2024 07:00)  HR: 60 (06 Aug 2024 11:31) (60 - 74)  BP: 142/78 (06 Aug 2024 11:31) (112/60 - 154/76)  BP(mean): 80 (05 Aug 2024 23:30) (73 - 83)  RR: 18 (06 Aug 2024 11:31) (12 - 19)  SpO2: 98% (06 Aug 2024 11:31) (93% - 100%)    Parameters below as of 06 Aug 2024 11:31  Patient On (Oxygen Delivery Method): room air        PHYSICAL EXAM:    HEAD:  Atraumatic, Normocephalic  EYES: EOMI, PERRLA, conjunctiva and sclera clear  ENMT: No tonsillar erythema, exudates, or enlargement; Moist mucous membranes, Good dentition, No lesions  NECK: Supple, No JVD, Normal thyroid      LABS:                        8.0    8.19  )-----------( 133      ( 06 Aug 2024 06:02 )             24.6     08-06    139  |  107  |  65<H>  ----------------------------<  114<H>  4.0   |  25  |  1.21    Ca    9.1      06 Aug 2024 06:02  Mg     2.3     08-05    TPro  6.4  /  Alb  2.9<L>  /  TBili  0.2  /  DBili  x   /  AST  17  /  ALT  17  /  AlkPhos  53  08-05      Urinalysis Basic - ( 06 Aug 2024 06:02 )    Color: x / Appearance: x / SG: x / pH: x  Gluc: 114 mg/dL / Ketone: x  / Bili: x / Urobili: x   Blood: x / Protein: x / Nitrite: x   Leuk Esterase: x / RBC: x / WBC x   Sq Epi: x / Non Sq Epi: x / Bacteria: x      CAPILLARY BLOOD GLUCOSE      POCT Blood Glucose.: 141 mg/dL (06 Aug 2024 11:45)  POCT Blood Glucose.: 134 mg/dL (06 Aug 2024 08:37)  POCT Blood Glucose.: 182 mg/dL (05 Aug 2024 18:41)

## 2024-08-06 NOTE — PATIENT PROFILE ADULT - FALL HARM RISK - HARM RISK INTERVENTIONS
Assistance with ambulation/Assistance OOB with selected safe patient handling equipment/Communicate Risk of Fall with Harm to all staff/Discuss with provider need for PT consult/Monitor gait and stability/Reinforce activity limits and safety measures with patient and family/Tailored Fall Risk Interventions/Visual Cue: Yellow wristband and red socks/Bed in lowest position, wheels locked, appropriate side rails in place/Call bell, personal items and telephone in reach/Instruct patient to call for assistance before getting out of bed or chair/Non-slip footwear when patient is out of bed/[place] to call system/Physically safe environment - no spills, clutter or unnecessary equipment/Purposeful Proactive Rounding/Room/bathroom lighting operational, light cord in reach

## 2024-08-06 NOTE — DISCHARGE NOTE NURSING/CASE MANAGEMENT/SOCIAL WORK - PATIENT PORTAL LINK FT
You can access the FollowMyHealth Patient Portal offered by [facility] by registering at the following website: [email or web address]. By joining [facility]’s FollowMyHealth portal, you will also be able to view your health information using other applications (apps) compatible with our system.

## 2024-08-06 NOTE — DISCHARGE NOTE PROVIDER - NSDCCPCAREPLAN_GEN_ALL_CORE_FT
PRINCIPAL DISCHARGE DIAGNOSIS  Diagnosis: Anemia  Assessment and Plan of Treatment: You were admitted to the hospital due to anemia, weakness and syncope. Your initial hemoglobin was 5.9, and after 2 units of blood it is now 8. Please follow up with your doctors in [place], and continue routine transfusions.      SECONDARY DISCHARGE DIAGNOSES  Diagnosis: Dark stools  Assessment and Plan of Treatment:

## 2024-08-06 NOTE — PATIENT PROFILE ADULT - FUNCTIONAL ASSESSMENT - BASIC MOBILITY SECTION LABEL
"MY INFORMATION ON SLEEP APNEA-  Rajinder Siegel    DOCTOR : CEE Perry CNP  SLEEP CENTER :      MY CONTACT NUMBER:   [facility]  [phone]  [facility]   [phone]  [facility]   [phone]      [facility]  [phone]  [facility]   [phone]    [facility] [place] [address], [address]  [place] [address]  Phone: [phone]    Hours:  Mon - Fri: 8:00 a.m. - 4:30 p.m.  Sat: Closed  Sun: Closed      Key Points:  1. What is Obstructive Sleep Apnea (RACHEAL)? RACHEAL is the most common type of sleep apnea. Apnea literally means, \"without breath.\" It is characterized by repetitive pauses in breathing, despite continued effort to breathe, and is usually associated with a reduction in blood oxygen saturation. Apneas can last 10 to over 60 seconds. It is caused by narrowing or collapse of the upper airway as muscles relax during sleep.   2. What are the consequences of RACHEAL? Symptoms include: daytime sleepiness- possibly increasing the risk of falling asleep while driving, unrefreshing/restless sleep, snoring, insomnia, waking frequently to urinate, waking with heartburn or reflux, reduced concentration and memory, and morning headaches. Other health consequences may include development of high blood pressure. Untreated RACHEAL also can contribute to heart disease, stroke and diabetes.   3. What are the treatment options? In most situations, sleep apnea is a lifelong disease that must be managed with daily therapy. Continuous Positive Airway (CPAP) is the most reliable treatment. A mouthguard to hold your jaw forward is usually the next most reliable option. Other options include postioning devices (to keep you off your back), nasal valves, tongue retaining device, weight loss, surgery. There is more detail about these options toward the end of this " document.  4. What are the most important things to remember about using CPAP?     WHERE CAN I FIND MORE INFORMATION?    American Academy of Sleep Medicine Patient information on sleep disorders:  http://yoursleep.aasmnet.org    CPAP -  WHY AND HOW?                 Continuous positive airway pressure, or CPAP, is the most effective treatment for obstructive sleep apnea. It works by blowing room air, through a mask, to hold your throat open. A decision to use CPAP is a major step forward in the pursuit of a healthier life. The successful use of CPAP will help you breathe easier, sleep better and live healthier. Using CPAP can be a positive experience if you keep these price points in mind:  Commitment  CPAP is not a quick fix for your problem. It involves a long-term commitment to improve your sleep and your health.    Communication  Stay in close communication with both your sleep doctor and your CPAP supplier. Ask lots of questions and seek help when you need it.    Consistency  Use CPAP all night, every night and for every nap. You will receive the maximum health benefits from CPAP when you use it every time that you sleep. This will also make it easier for your body to adjust to the treatment.    Correction  The first machine and mask that you try may not be the best ones for you. Work with your sleep doctor and your CPAP supplier to make corrections to your equipment selection. Ask about trying a different type of machine or mask if you have ongoing problems. Make sure that your mask is a good fit and learn to use your equipment properly.    Challenge  Tell a family member or close friend to ask you each morning if you used your CPAP the previous night. Have someone to challenge you to give it your best effort.    Connection   Your adjustment to CPAP will be easier if you are able to connect with others who use the same treatment. Ask your sleep doctor if there is a support group in your area for people who have  "sleep apnea, or look for one on the Internet.  Comfort   Increase your level of comfort by using a saline spray, decongestant or heated humidifier if CPAP irritates your nose, mouth or throat. Use your unit's \"ramp\" setting to slowly get used to the air pressure level. There may be soft pads you can buy that will fit over your mask straps. Look on www.CPAP.com for accessories that can help make CPAP use more comfortable.  Cleaning   Clean your mask, tubing and headgear on a regular basis. Put this time in your schedule so that you don't forget to do it. Check and replace the filters for your CPAP unit and humidifier.    Completion   Although you are never finished with CPAP therapy, you should reward yourself by celebrating the completion of your first month of treatment. Expect this first month to be your hardest period of adjustment. It will involve some trial and error as you find the machine, mask and pressure settings that are right for you.    Continuation  After your first month of treatment, continue to make a daily commitment to use your CPAP all night, every night and for every nap.    CPAP-Tips to starting with success:  Begin using your CPAP for short periods of time during the day while you watch TV or read.    Use CPAP every night and for every nap. Using it less often reduces the health benefits and makes it harder for your body to get used to it.    Newer CPAP models are virtually silent; however, if you find the sound of your CPAP machine to be bothersome, place the unit under your bed to dampen the sound.     Make small adjustments to your mask, tubing, straps and headgear until you get the right fit. Tightening the mask may actually worsen the leak.  If it leaves significant marks on your face or irritates the bridge of your nose, it may not be the best mask for you.  Speak with the person who supplied the mask and consider trying other masks. Insurances will allow you to try different masks " during the first month of starting CPAP.  Insurance also covers a new mask, hose and filter about every 6 months.    Use a saline nasal spray to ease mild nasal congestion. Neti-Pot or saline nasal rinses may also help. Nasal gel sprays can help reduce nasal dryness.  Biotene mouthwash can be helpful to protect your teeth if you experience frequent dry mouth.  Dry mouth may be a sign of air escaping out of your mouth or out of the mask in the case of a full face mask.  Speak with your provider if you expect that is the case.     Take a nasal decongestant to relieve more severe nasal or sinus congestion.  Do not use Afrin (oxymetazoline) nasal spray more than 3 days in a row.  Speak with your sleep doctor if your nasal congestion is chronic.    Use a heated humidifier that fits your CPAP model to enhance your breathing comfort. Adjust the heat setting up if you get a dry nose or throat, down if you get condensation in the hose or mask.  Position the CPAP lower than you so that any condensation in the hose drains back into the machine rather than towards the mask.    Try a system that uses nasal pillows if traditional masks give you problems.    Clean your mask, tubing and headgear once a week. Make sure the equipment dries fully.    Regularly check and replace the filters for your CPAP unit and humidifier.    Work closely with your sleep provider and your CPAP supplier to make sure that you have the machine, mask and air pressure setting that works best for you. It is better to stop using it and call your provider to solve problems than to lay awake all night frustrated with the device.  Weight Loss:    Weight loss decreases severity of sleep apnea in most people with obesity. For those with mild obesity who have developed snoring with weight gain, even 15-30 pound weight loss can improve and occasionally eliminate sleep apnea.  Structured and life-long dietary and health habits are necessary to lose weight and keep  healthier weight levels.     Though there are significant health benefits from weight loss, long-term weight loss is very difficult to achieve- studies show success with dietary management in less than 10% of people. In addition, substantial weight loss may require years of dietary control and may be difficult if patients have severe obesity. In these cases, surgical management may be considered.    If you are interested in methods for weight loss, you should review the options discussed at the National Institutes of Health patient information sites:     http://win.niddk.nih.gov/publications/index.htm  http:/www.health.nih.gov/topic/WeightLossDieting    Bariatric programs offer counseling in all methods of weight loss:    Http:/[email or web address]    Your BMI is Body mass index is 30.13 kg/m .    Weight management plan: Patient was referred to their PCP to discuss a diet and exercise plan.    Body mass index (BMI) is one way to tell whether you are at a healthy weight, overweight, or obese. It measures your weight in relation to your height.  A BMI of 18.5 to 24.9 is in the healthy range. A person with a BMI of 25 to 29.9 is considered overweight, and someone with a BMI of 30 or greater is considered obese.  Another way to find out if you are at risk for health problems caused by overweight and obesity is to measure your waist. If you are a woman and your waist is more than 35 inches, or if you are a man and your waist is more than 40 inches, your risk of disease may be higher.  More than two-thirds of American adults are considered overweight or obese. Being overweight or obese increases the risk for further weight gain.  Excess weight may lead to heart disease and diabetes. Creating and following plans for healthy eating and physical activity may help you improve your health.    Methods for maintaining or losing weight.    Weight control is part of healthy  lifestyle and includes exercise, emotional health, and healthy eating habits.  Careful eating habits lifelong is the mainstay of weight control.  Though there are significant health benefits from weight loss, long-term weight loss with diet alone may be very difficult to achieve- studies show long-term success with dietary management in less than 10% of people. Attaining a healthy weight may be especially difficult to achieve in those with severe obesity. In some cases, medications, devices and surgical management might be considered.    What can you do?    If you are overweight or obese and are interested in methods for weight loss, you should discuss this with your provider. In addition, we recommend that you review healthy life styles and methods for weight loss available through the National Institutes of Health patient information sites:     http://win.niddk.nih.gov/publications/index.htm   .

## 2024-08-06 NOTE — DISCHARGE NOTE PROVIDER - NSDCMRMEDTOKEN_GEN_ALL_CORE_FT
aspirin 81 mg oral tablet: 1 tab(s) orally once a day takes 100 mg daily at home, from [place]  citalopram 20 mg oral tablet: 1 tab(s) orally once a day  doxazosin 4 mg oral tablet: 1 tab(s) orally once a day  ferrous sulfate 324 mg (65 mg elemental iron) oral tablet: 1 tab(s) orally once a day takes 80 mg elemental iron at home, from [place]  gliclazide 60 mg: takes at home, from [place]  linagliptin 5 mg oral tablet: 1 tab(s) orally once a day  metoprolol tartrate 50 mg oral tablet: 1 tab(s) orally once a day  NexIUM 40 mg oral delayed release capsule: 1 cap(s) orally once a day  valsartan-hydrochlorothiazide 160 mg-12.5 mg oral tablet: 1 tab(s) orally once a day

## 2024-08-06 NOTE — DISCHARGE NOTE NURSING/CASE MANAGEMENT/SOCIAL WORK - NSDCPEFALRISK_GEN_ALL_CORE
For information on Fall & Injury Prevention, visit: [email or web address] OR  [email or web address] OR  https://www.cdc.gov/steadi/patient.html

## 2024-08-06 NOTE — PROGRESS NOTE ADULT - ASSESSMENT
A/P    1. Acute blood loss anemia:  - s/p PRBCs transfusion  - GI consult requested -  will see the patient this afternoon  - PPI    2. HTN:  - Well controlled    3. CAD, hyperlipidemia:  - S/p 3 stents  - ASA on hold due to bleeding      Nitza Toure MD A/P    1. Acute blood loss anemia:  - s/p PRBCs transfusion  - GI consult requested -  will see the patient this afternoon  - PPI    2. HTN:  - Well controlled    3. CAD, hyperlipidemia:  - S/p 3 stents  - ASA on hold due to bleeding    4. Acute kidney injury:  - Improved  - Likely prerenal    5. Syncope:  - Likely due to anemia.       Nitza Toure MD

## 2024-08-07 LAB
CULTURE RESULTS: SIGNIFICANT CHANGE UP
SPECIMEN SOURCE: SIGNIFICANT CHANGE UP

## 2024-08-19 DIAGNOSIS — K92.2 GASTROINTESTINAL HEMORRHAGE, UNSPECIFIED: ICD-10-CM

## 2024-08-19 DIAGNOSIS — I95.9 HYPOTENSION, UNSPECIFIED: ICD-10-CM

## 2024-08-19 DIAGNOSIS — N17.9 ACUTE KIDNEY FAILURE, UNSPECIFIED: ICD-10-CM

## 2024-08-19 DIAGNOSIS — Z79.84 LONG TERM (CURRENT) USE OF ORAL HYPOGLYCEMIC DRUGS: ICD-10-CM

## 2024-08-19 DIAGNOSIS — D62 ACUTE POSTHEMORRHAGIC ANEMIA: ICD-10-CM

## 2024-08-19 DIAGNOSIS — E11.9 TYPE 2 DIABETES MELLITUS WITHOUT COMPLICATIONS: ICD-10-CM

## 2024-08-19 DIAGNOSIS — E86.1 HYPOVOLEMIA: ICD-10-CM

## 2024-08-19 DIAGNOSIS — Z95.5 PRESENCE OF CORONARY ANGIOPLASTY IMPLANT AND GRAFT: ICD-10-CM

## 2024-08-19 DIAGNOSIS — I10 ESSENTIAL (PRIMARY) HYPERTENSION: ICD-10-CM

## 2024-08-19 DIAGNOSIS — E78.00 PURE HYPERCHOLESTEROLEMIA, UNSPECIFIED: ICD-10-CM

## 2024-08-19 DIAGNOSIS — R11.10 VOMITING, UNSPECIFIED: ICD-10-CM

## 2024-08-19 DIAGNOSIS — I25.10 ATHEROSCLEROTIC HEART DISEASE OF NATIVE CORONARY ARTERY WITHOUT ANGINA PECTORIS: ICD-10-CM

## 2024-08-19 DIAGNOSIS — Z79.82 LONG TERM (CURRENT) USE OF ASPIRIN: ICD-10-CM

## 2025-04-14 NOTE — PATIENT PROFILE ADULT - FUNCTIONAL ASSESSMENT - BASIC MOBILITY 3.
Here for hormone therapy injection, no complaints at this time, Injection given as ordered, tolerated well, no report of pain prior to or after injection. Return to clinic as scheduled.     Site - LB    Testosterone  76 mg  Delestrogen 10 mg    Clinic Supplied Medication    
3 = A little assistance